# Patient Record
Sex: MALE | Race: WHITE | NOT HISPANIC OR LATINO
[De-identification: names, ages, dates, MRNs, and addresses within clinical notes are randomized per-mention and may not be internally consistent; named-entity substitution may affect disease eponyms.]

---

## 2020-06-16 ENCOUNTER — APPOINTMENT (OUTPATIENT)
Dept: ORTHOPEDIC SURGERY | Facility: CLINIC | Age: 45
End: 2020-06-16
Payer: COMMERCIAL

## 2020-06-16 VITALS — HEIGHT: 71 IN | BODY MASS INDEX: 34.3 KG/M2 | WEIGHT: 245 LBS

## 2020-06-16 DIAGNOSIS — Z72.3 LACK OF PHYSICAL EXERCISE: ICD-10-CM

## 2020-06-16 DIAGNOSIS — Z78.9 OTHER SPECIFIED HEALTH STATUS: ICD-10-CM

## 2020-06-16 DIAGNOSIS — Z80.9 FAMILY HISTORY OF MALIGNANT NEOPLASM, UNSPECIFIED: ICD-10-CM

## 2020-06-16 PROBLEM — Z00.00 ENCOUNTER FOR PREVENTIVE HEALTH EXAMINATION: Status: ACTIVE | Noted: 2020-06-16

## 2020-06-16 PROCEDURE — 99204 OFFICE O/P NEW MOD 45 MIN: CPT

## 2020-06-16 PROCEDURE — 72050 X-RAY EXAM NECK SPINE 4/5VWS: CPT

## 2020-06-18 PROBLEM — Z78.9 NON-SMOKER: Status: ACTIVE | Noted: 2020-06-16

## 2020-06-18 PROBLEM — Z78.9 NO PERTINENT PAST MEDICAL HISTORY: Status: RESOLVED | Noted: 2020-06-18 | Resolved: 2020-06-18

## 2020-06-18 PROBLEM — Z78.9 DOES NOT USE ILLICIT DRUGS: Status: ACTIVE | Noted: 2020-06-16

## 2020-06-18 PROBLEM — Z72.3 DOES NOT EXERCISE: Status: ACTIVE | Noted: 2020-06-16

## 2020-06-18 PROBLEM — Z80.9 FAMILY HISTORY OF MALIGNANT NEOPLASM: Status: ACTIVE | Noted: 2020-06-16

## 2020-06-18 RX ORDER — TIZANIDINE 2 MG/1
2 TABLET ORAL
Qty: 90 | Refills: 0 | Status: ACTIVE | COMMUNITY
Start: 2020-05-21

## 2020-06-18 RX ORDER — KETOROLAC TROMETHAMINE 60 MG/2ML
60 INJECTION, SOLUTION INTRAMUSCULAR
Qty: 2 | Refills: 0 | Status: ACTIVE | COMMUNITY
Start: 2020-01-07

## 2020-06-18 RX ORDER — METHYLPREDNISOLONE 4 MG/1
4 TABLET ORAL
Qty: 21 | Refills: 0 | Status: ACTIVE | COMMUNITY
Start: 2020-03-05

## 2020-06-18 RX ORDER — GABAPENTIN 100 MG/1
CAPSULE ORAL
Refills: 0 | Status: ACTIVE | COMMUNITY

## 2020-06-18 RX ORDER — CEFTRIAXONE 2 G/1
2 INJECTION, POWDER, FOR SOLUTION INTRAMUSCULAR; INTRAVENOUS
Qty: 1 | Refills: 0 | Status: ACTIVE | COMMUNITY
Start: 2020-01-07

## 2020-06-18 RX ORDER — GENTAMICIN SULFATE 40 MG/ML
40 INJECTION, SOLUTION INTRAMUSCULAR; INTRAVENOUS
Qty: 4 | Refills: 0 | Status: ACTIVE | COMMUNITY
Start: 2020-01-07

## 2020-06-18 NOTE — CONSULT LETTER
[Consult Letter:] : I had the pleasure of evaluating your patient, [unfilled]. [Dear  ___] : Dear  [unfilled], [Please see my note below.] : Please see my note below. [Sincerely,] : Sincerely, [FreeTextEntry3] : Harris Torres MD\par Ortho Spine Surgery\par [facility]

## 2020-06-18 NOTE — PHYSICAL EXAM
[de-identified] : General: No acute distress, conversant, well-nourished.\par Head: Normocephalic, atraumatic\par Neck: trachea midline, FROM\par Heart: normotensive and normal rate and rhythm\par Lungs: No labored breathing\par Skin: No abrasions, no rashes, no edema\par Psych: Alert and oriented to person, place and time\par Extremities: no peripheral edema or digital cyanosis\par Gait: Normal gait. Can perform tandem gait.  \par Vascular: warm and well perfused distally, palpable distal pulses\par \par MSK:\par Cervical Spine: \par No tenderness to palpation.  No step-off, no deformity.\par \par NEURO:\par Sensation \par          Left           \par C5     1/2               \par C6     1/2               \par C7     1/2               \par C8    1/2              \par T1     2/2             \par \par          Right         \par C5     1/2               \par C6     1/2               \par C7     1/2               \par C8     1/2              \par T1     2/2      \par \par Motor: \par                                                Left             \par C5 (deltoid abduction)             5/5               \par C6 (biceps flexion)                   5/5                \par C7 (triceps extension)             5/5               \par C8 (finger flexion)                     5/5               \par T1 (interosseous)                     5/5           \par \par                                                Right           \par C5 (deltoid abduction)             5/5               \par C6 (biceps flexion)                   5/5                \par C7 (triceps extension)             5/5               \par C8 (finger flexion)                     5/5               \par T1 (interosseous)                     5/5                     \par \par Sensation \par Left L2  -  2/2            \par Left L3  -  2/2\par Left L4  -  2/2\par Left L5  -  2/2\par Left S1  -  2/2\par \par Right L2  -  2/2            \par Right L3  -  2/2\par Right L4  -  2/2\par Right L5  -  2/2\par Right S1  -  2/2\par \par Motor: \par Left L2 (hip flexion)                            5/5                \par Left L3 (knee extension)                   5/5                \par Left L4 (ankle dorsiflexion)                 5/5                \par Left L5 (long toe extensor)                5/5                \par Left S1 (ankle plantar flexion)           5/5\par \par Right L2 (hip flexion)                            5/5                \par Right L3 (knee extension)                   5/5                \par Right L4 (ankle dorsiflexion)                 5/5                \par Right L5 (long toe extensor)                5/5                \par Right S1 (ankle plantar flexion)           5/5\par \par Reflexes: Increased reflexes in upper extremities\par Negative Spurling’s test.  Positive Almeida’s reflex.  \par Negative clonus.  Down-going Babinski. [de-identified] : Cervical MRI (3/9/20): Large C3-C4 right sided disc herniation with compression of the spinal cord.  T2 signal change in the cord likely representing myelomalacia. Multilevel disc bulges at C4-C5, C5-C6 and C6-C7.\par \par Cervical AP, lateral, flexion and extension radiographs obtained in the office today shows no fracture or dislocation.  There is cervical spondylosis and prominent anterior osteophyte formation at C4-C5, C5-C6, and C6-C7.  Multilevel loss of disc height.   No instability seen on dynamic films.

## 2020-06-18 NOTE — HISTORY OF PRESENT ILLNESS
[de-identified] : 44 year old male presents with 6 months of progressively worsening hand tingling and numbness.  Her also describes muscle aches in his arms.  He describes feeling weak in his hands and has been dropping items frequently.  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  He denies any balance problems, or bowel/bladder symptoms.  He initially went to a pain management doctor who provided him with a cervical epidural that did not provide any relief of his symptoms.

## 2020-06-18 NOTE — ASSESSMENT
[FreeTextEntry1] : 44 year old male presents with 6 months of progressively worsening numbness and weakness in his hands.  He has been dropping items and reports fine motor skill impairment at his work.  He has a C3-C4 disc herniation causing significant cord compression with cord signal change likely representing myelomalacia.  He has signs of myelopathy.  We discussed the risks and benefits of surgery: ACDF C3-C4.  The risks include anesthesia, blood loss, need for blood transfusion, clots, stroke, myocardial infarct, chronic pain, loss of function, damage to neurovascular structures, hardware failure, nonunion and need for reoperation. The patient understands these risks and elects to proceed with surgery.  He asked several excellent questions which were answered.  He understands that due to his cord compression if he has a fall he has a high chance of a spinal cord injury.  He understands this and will avoid activities such as biking.  He will be scheduled for surgery.  He will be sent for preoperative clearance.  He will get a cervical CT for preop planning.  He will followup prior to the date of his surgery.  He knows to call with any questions or concerns or if his symptoms acutely worsen.

## 2020-07-14 ENCOUNTER — APPOINTMENT (OUTPATIENT)
Dept: ORTHOPEDIC SURGERY | Facility: CLINIC | Age: 45
End: 2020-07-14
Payer: COMMERCIAL

## 2020-07-14 VITALS — TEMPERATURE: 97.6 F

## 2020-07-14 PROCEDURE — 99213 OFFICE O/P EST LOW 20 MIN: CPT

## 2020-07-17 VITALS
HEART RATE: 70 BPM | TEMPERATURE: 97 F | HEIGHT: 71 IN | RESPIRATION RATE: 16 BRPM | DIASTOLIC BLOOD PRESSURE: 85 MMHG | SYSTOLIC BLOOD PRESSURE: 127 MMHG | OXYGEN SATURATION: 98 % | WEIGHT: 244.71 LBS

## 2020-07-17 RX ORDER — POVIDONE-IODINE 5 %
1 AEROSOL (ML) TOPICAL ONCE
Refills: 0 | Status: COMPLETED | OUTPATIENT
Start: 2020-07-20 | End: 2020-07-20

## 2020-07-19 ENCOUNTER — TRANSCRIPTION ENCOUNTER (OUTPATIENT)
Age: 45
End: 2020-07-19

## 2020-07-19 NOTE — REVIEW OF SYSTEMS
[Negative] : Heme/Lymph [FreeTextEntry9] : neck pain radiating to bilateral upper extremities, tingling in hands

## 2020-07-19 NOTE — ASSESSMENT
[FreeTextEntry1] : 44 year old male with cervical myelopathy and radiculopathy worsening over the last 7 months.  He has numbness in his hand bilateral and reports fine motor deficits. He has cord compression with signal change at C3-C4.  We recommending an ACDF C3-C4 to decompression the spinal cord. He also has evidence of OPLL and moderate stenosis C4-C5, C5-C6 and C6-C7.  He will likely require additional surgery in the future if this worsens which he understands.  At this time the cord compression with signal change must be addressed.  We discussed the risks and benefits of surgery.  The risks include anesthesia, blood loss, need for blood transfusion, Covid-19, durotomy, infection, clots, stroke, myocardial infarct, chronic pain, loss of function, damage to neurovascular structures, hardware failure, nonunion and need for reoperation. The patient understands these risks and elects to proceed with surgery.  He will obtain a Covid-19 test 72 hours prior to his surgery.  He knows to call with any questions or concerns or if his symptoms acutely worsen.

## 2020-07-19 NOTE — HISTORY OF PRESENT ILLNESS
[de-identified] : 44 year old male returns for followup with continued hand numbness, weakness with  strength and fine motor deficits.  He has pain in his neck and hands.  He is here to discuss surgical intervention. Since his last visit he had a cervical CT scan.  He denies any balance problems or bowel/bladder issues.

## 2020-07-19 NOTE — PHYSICAL EXAM
[de-identified] : General: No acute distress, conversant, well-nourished.\par Head: Normocephalic, atraumatic\par Neck: trachea midline, FROM\par Heart: normotensive and normal rate and rhythm\par Lungs: No labored breathing\par Skin: No abrasions, no rashes, no edema\par Psych: Alert and oriented to person, place and time\par Extremities: no peripheral edema or digital cyanosis\par Gait: Normal gait. Can perform tandem gait.  \par Vascular: warm and well perfused distally, palpable distal pulses\par \par MSK:\par Cervical Spine: \par No tenderness to palpation.  No step-off, no deformity.\par \par NEURO:\par Sensation \par          Left           \par C5     1/2               \par C6     1/2               \par C7     1/2               \par C8    1/2              \par T1     2/2             \par \par          Right         \par C5     1/2               \par C6     1/2               \par C7     1/2               \par C8     1/2              \par T1     2/2      \par \par Motor: \par                                                Left             \par C5 (deltoid abduction)             5/5               \par C6 (biceps flexion)                   5/5                \par C7 (triceps extension)             5/5               \par C8 (finger flexion)                     5/5               \par T1 (interosseous)                     5/5           \par \par                                                Right           \par C5 (deltoid abduction)             5/5               \par C6 (biceps flexion)                   5/5                \par C7 (triceps extension)             5/5               \par C8 (finger flexion)                     5/5               \par T1 (interosseous)                     5/5                     \par \par Sensation \par Left L2  -  2/2            \par Left L3  -  2/2\par Left L4  -  2/2\par Left L5  -  2/2\par Left S1  -  2/2\par \par Right L2  -  2/2            \par Right L3  -  2/2\par Right L4  -  2/2\par Right L5  -  2/2\par Right S1  -  2/2\par \par Motor: \par Left L2 (hip flexion)                            5/5                \par Left L3 (knee extension)                   5/5                \par Left L4 (ankle dorsiflexion)                 5/5                \par Left L5 (long toe extensor)                5/5                \par Left S1 (ankle plantar flexion)           5/5\par \par Right L2 (hip flexion)                            5/5                \par Right L3 (knee extension)                   5/5                \par Right L4 (ankle dorsiflexion)                 5/5                \par Right L5 (long toe extensor)                5/5                \par Right S1 (ankle plantar flexion)           5/5\par \par Reflexes: Increased reflexes in upper extremities\par Negative Spurling’s test.  Positive Almeida’s reflex.  \par Negative clonus.  Down-going Babinski. [de-identified] : CT Cervical Spine (6/23/20): Multilevel degenerative changes of the cervical spine with evidence of DISH and OPLL.  Moderate to severe stenosis with cord compression at C3-C4. Right worse than left disc osteophyte complex at C3-C4.   Moderate stenosis at C4-C5, C5-C6, C6-C7.  \par \par Cervical MRI (3/9/20): Large C3-C4 right sided disc herniation with compression of the spinal cord.  T2 signal change in the cord likely representing myelomalacia. Multilevel disc herniations at C4-C5, C5-C6 and C6-C7.  Disc herniations at C5-C6, C6-C7 are left sided.  \par \par Cervical AP, lateral, flexion and extension radiographs (6/16/20) shows no fracture or dislocation.  There is cervical spondylosis and prominent anterior osteophyte formation at C4-C5, C5-C6, and C6-C7.  Multilevel loss of disc height.   No instability seen on dynamic films.

## 2020-07-20 ENCOUNTER — APPOINTMENT (OUTPATIENT)
Dept: ORTHOPEDIC SURGERY | Facility: HOSPITAL | Age: 45
End: 2020-07-20

## 2020-07-20 ENCOUNTER — INPATIENT (INPATIENT)
Facility: HOSPITAL | Age: 45
LOS: 1 days | Discharge: HOME | DRG: 472 | End: 2020-07-22
Attending: STUDENT IN AN ORGANIZED HEALTH CARE EDUCATION/TRAINING PROGRAM | Admitting: STUDENT IN AN ORGANIZED HEALTH CARE EDUCATION/TRAINING PROGRAM
Payer: COMMERCIAL

## 2020-07-20 DIAGNOSIS — Z41.9 ENCOUNTER FOR PROCEDURE FOR PURPOSES OTHER THAN REMEDYING HEALTH STATE, UNSPECIFIED: Chronic | ICD-10-CM

## 2020-07-20 DIAGNOSIS — G47.33 OBSTRUCTIVE SLEEP APNEA (ADULT) (PEDIATRIC): ICD-10-CM

## 2020-07-20 DIAGNOSIS — E78.5 HYPERLIPIDEMIA, UNSPECIFIED: ICD-10-CM

## 2020-07-20 DIAGNOSIS — R73.03 PREDIABETES: ICD-10-CM

## 2020-07-20 DIAGNOSIS — N20.0 CALCULUS OF KIDNEY: ICD-10-CM

## 2020-07-20 DIAGNOSIS — M54.2 CERVICALGIA: ICD-10-CM

## 2020-07-20 LAB
BLD GP AB SCN SERPL QL: NEGATIVE — SIGNIFICANT CHANGE UP
RH IG SCN BLD-IMP: NEGATIVE — SIGNIFICANT CHANGE UP

## 2020-07-20 PROCEDURE — 22845 INSERT SPINE FIXATION DEVICE: CPT

## 2020-07-20 PROCEDURE — 22551 ARTHRD ANT NTRBDY CERVICAL: CPT

## 2020-07-20 RX ORDER — SODIUM CHLORIDE 9 MG/ML
1000 INJECTION, SOLUTION INTRAVENOUS
Refills: 0 | Status: DISCONTINUED | OUTPATIENT
Start: 2020-07-20 | End: 2020-07-22

## 2020-07-20 RX ORDER — OXYCODONE HYDROCHLORIDE 5 MG/1
5 TABLET ORAL EVERY 4 HOURS
Refills: 0 | Status: DISCONTINUED | OUTPATIENT
Start: 2020-07-20 | End: 2020-07-22

## 2020-07-20 RX ORDER — CHLORHEXIDINE GLUCONATE 213 G/1000ML
1 SOLUTION TOPICAL ONCE
Refills: 0 | Status: COMPLETED | OUTPATIENT
Start: 2020-07-20 | End: 2020-07-20

## 2020-07-20 RX ORDER — ACETAMINOPHEN 500 MG
975 TABLET ORAL EVERY 8 HOURS
Refills: 0 | Status: DISCONTINUED | OUTPATIENT
Start: 2020-07-20 | End: 2020-07-22

## 2020-07-20 RX ORDER — GABAPENTIN 400 MG/1
300 CAPSULE ORAL THREE TIMES A DAY
Refills: 0 | Status: DISCONTINUED | OUTPATIENT
Start: 2020-07-20 | End: 2020-07-22

## 2020-07-20 RX ORDER — ONDANSETRON 8 MG/1
4 TABLET, FILM COATED ORAL EVERY 6 HOURS
Refills: 0 | Status: DISCONTINUED | OUTPATIENT
Start: 2020-07-20 | End: 2020-07-22

## 2020-07-20 RX ORDER — CEFAZOLIN SODIUM 1 G
2000 VIAL (EA) INJECTION EVERY 8 HOURS
Refills: 0 | Status: COMPLETED | OUTPATIENT
Start: 2020-07-20 | End: 2020-07-21

## 2020-07-20 RX ORDER — POLYETHYLENE GLYCOL 3350 17 G/17G
17 POWDER, FOR SOLUTION ORAL DAILY
Refills: 0 | Status: DISCONTINUED | OUTPATIENT
Start: 2020-07-20 | End: 2020-07-22

## 2020-07-20 RX ORDER — MAGNESIUM HYDROXIDE 400 MG/1
30 TABLET, CHEWABLE ORAL DAILY
Refills: 0 | Status: DISCONTINUED | OUTPATIENT
Start: 2020-07-20 | End: 2020-07-22

## 2020-07-20 RX ORDER — BENZOCAINE AND MENTHOL 5; 1 G/100ML; G/100ML
1 LIQUID ORAL EVERY 4 HOURS
Refills: 0 | Status: DISCONTINUED | OUTPATIENT
Start: 2020-07-20 | End: 2020-07-22

## 2020-07-20 RX ORDER — OXYCODONE HYDROCHLORIDE 5 MG/1
10 TABLET ORAL EVERY 4 HOURS
Refills: 0 | Status: DISCONTINUED | OUTPATIENT
Start: 2020-07-20 | End: 2020-07-22

## 2020-07-20 RX ORDER — HYDROMORPHONE HYDROCHLORIDE 2 MG/ML
0.5 INJECTION INTRAMUSCULAR; INTRAVENOUS; SUBCUTANEOUS
Refills: 0 | Status: DISCONTINUED | OUTPATIENT
Start: 2020-07-20 | End: 2020-07-22

## 2020-07-20 RX ORDER — HYDROMORPHONE HYDROCHLORIDE 2 MG/ML
0.5 INJECTION INTRAMUSCULAR; INTRAVENOUS; SUBCUTANEOUS EVERY 4 HOURS
Refills: 0 | Status: DISCONTINUED | OUTPATIENT
Start: 2020-07-20 | End: 2020-07-22

## 2020-07-20 RX ORDER — DIAZEPAM 5 MG
5 TABLET ORAL EVERY 8 HOURS
Refills: 0 | Status: DISCONTINUED | OUTPATIENT
Start: 2020-07-20 | End: 2020-07-22

## 2020-07-20 RX ADMIN — Medication 100 MILLIGRAM(S): at 17:21

## 2020-07-20 RX ADMIN — Medication 975 MILLIGRAM(S): at 17:22

## 2020-07-20 RX ADMIN — GABAPENTIN 300 MILLIGRAM(S): 400 CAPSULE ORAL at 17:34

## 2020-07-20 RX ADMIN — GABAPENTIN 300 MILLIGRAM(S): 400 CAPSULE ORAL at 21:33

## 2020-07-20 RX ADMIN — Medication 975 MILLIGRAM(S): at 18:38

## 2020-07-20 RX ADMIN — Medication 1 APPLICATION(S): at 07:35

## 2020-07-20 RX ADMIN — CHLORHEXIDINE GLUCONATE 1 APPLICATION(S): 213 SOLUTION TOPICAL at 07:46

## 2020-07-20 NOTE — H&P ADULT - NSHPLABSRESULTS_GEN_ALL_CORE
Preop cbc/cmp/pt/inr/ptt/ua- nwl  Preop EKG: NSR, reviewed by medical clearance.   Povidone iodine nasal swab to be given day of surgery   type and screen DOS  COVID PCR negative 7/18/20

## 2020-07-20 NOTE — H&P ADULT - PROBLEM/PLAN-4
Discussed importance of compliance with ocular meds and follow up exams to prevent loss of vision. DISPLAY PLAN FREE TEXT

## 2020-07-20 NOTE — PROGRESS NOTE ADULT - SUBJECTIVE AND OBJECTIVE BOX
Ortho Post Op Check    Procedure: ACDF C3-C4   Surgeon: Brian     Patient seen and examined at bedside in PACU  Pt comfortable without complaints, pain controlled  Denies CP, SOB, N/V  c/o numbness bilateral digits consistent with preop     Vital Signs Last 24 Hrs  T(C): 36.7 (07-20-20 @ 12:43), Max: 36.7 (07-20-20 @ 12:43)  T(F): 98 (07-20-20 @ 12:43), Max: 98 (07-20-20 @ 12:43)  HR: 101 (07-20-20 @ 12:53) (101 - 104)  BP: 145/72 (07-20-20 @ 12:53) (141/73 - 145/79)  BP(mean): 100 (07-20-20 @ 12:53) (100 - 104)  RR: 21 (07-20-20 @ 12:53) (11 - 21)  SpO2: 98% (07-20-20 @ 12:53) (97% - 99%)  AVSS    General: Pt Alert and oriented, NAD  Dressing C/D/I over anterior cervical incision  GLADYS x1 in place   Pulses: 2+ radial pulses bilaterally   Sensation: decreased bilateral upper extremities forearms/digits   Motor:  strength/biceps/triceps AIN/PIN 5/5           A/P: 44yMale POD#0 s/p ACDF C3-C4     - Stable in PACU   - Pain Control IV, PO PRN   - DVT ppx: SCDs  - Post op abx: ancef x2   - monitor GLADYS drain output   - PT, WBS: WBAT, [place] J collar     Ortho Pager [phone]

## 2020-07-20 NOTE — H&P ADULT - NSHPPHYSICALEXAM_GEN_ALL_CORE
Gen: NAD sitting comfortably on chair  MSK: Skin well perfused. Radial pulses palpable BUE. Sensation intact to BUE although diminished to bilateral medial forearms compared to rest of arms.  strength/biceps/triceps 5/5 bilateral upper extremities. Decreased ROM cervical spine 2/2 pain.  Remainder of physical exam as per medical clearance note

## 2020-07-20 NOTE — H&P ADULT - NSICDXPASTMEDICALHX_GEN_ALL_CORE_FT
PAST MEDICAL HISTORY:  Borderline diabetes resolved after gastric sleeve    HLD (hyperlipidemia)     Morbid obesity     Neck pain     Nephrolithiasis     LISA (obstructive sleep apnea) resolved after gastric sleeve

## 2020-07-20 NOTE — H&P ADULT - NSICDXPASTSURGICALHX_GEN_ALL_CORE_FT
PAST SURGICAL HISTORY:  Surgery, elective sleeve gastrectomy, 2017    Surgery, elective right middle finger    Surgery, elective kidney stones

## 2020-07-20 NOTE — H&P ADULT - HISTORY OF PRESENT ILLNESS
44M with neck pain x 6 months without preceding accident, injury or trauma. The patient complains of neck pain with radiation of pain/numbness to arms (bilateral elbows to finger tips). Failed conservative therapies for his symptoms including physical therapy/epidural injections. Takes tylenol/gabapentin for his pain. Denies hx of DVT.  presents today for elective ACDF C3-4

## 2020-07-20 NOTE — H&P ADULT - PROBLEM SELECTOR PLAN 1
Admit to Orthopaedic Service.  Presents today for elective ACDF C3-4  Pt medically stable and cleared for procedure today by Dr. Back

## 2020-07-20 NOTE — PACU DISCHARGE NOTE - COMMENTS
PACU criteria met. See flowsheet. Report given to Yaima Collado RN managerJoe. Patient transported to [address] [place] [address] by RN and CNA.

## 2020-07-21 ENCOUNTER — TRANSCRIPTION ENCOUNTER (OUTPATIENT)
Age: 45
End: 2020-07-21

## 2020-07-21 LAB
ANION GAP SERPL CALC-SCNC: 12 MMOL/L — SIGNIFICANT CHANGE UP (ref 5–17)
BUN SERPL-MCNC: 7 MG/DL — SIGNIFICANT CHANGE UP (ref 7–23)
CALCIUM SERPL-MCNC: 9.3 MG/DL — SIGNIFICANT CHANGE UP (ref 8.4–10.5)
CHLORIDE SERPL-SCNC: 104 MMOL/L — SIGNIFICANT CHANGE UP (ref 96–108)
CO2 SERPL-SCNC: 26 MMOL/L — SIGNIFICANT CHANGE UP (ref 22–31)
CREAT SERPL-MCNC: 0.59 MG/DL — SIGNIFICANT CHANGE UP (ref 0.5–1.3)
GLUCOSE SERPL-MCNC: 94 MG/DL — SIGNIFICANT CHANGE UP (ref 70–99)
HCT VFR BLD CALC: 44.3 % — SIGNIFICANT CHANGE UP (ref 39–50)
HGB BLD-MCNC: 14.9 G/DL — SIGNIFICANT CHANGE UP (ref 13–17)
MCHC RBC-ENTMCNC: 28.6 PG — SIGNIFICANT CHANGE UP (ref 27–34)
MCHC RBC-ENTMCNC: 33.6 GM/DL — SIGNIFICANT CHANGE UP (ref 32–36)
MCV RBC AUTO: 85 FL — SIGNIFICANT CHANGE UP (ref 80–100)
NRBC # BLD: 0 /100 WBCS — SIGNIFICANT CHANGE UP (ref 0–0)
PLATELET # BLD AUTO: 153 K/UL — SIGNIFICANT CHANGE UP (ref 150–400)
POTASSIUM SERPL-MCNC: 3.8 MMOL/L — SIGNIFICANT CHANGE UP (ref 3.5–5.3)
POTASSIUM SERPL-SCNC: 3.8 MMOL/L — SIGNIFICANT CHANGE UP (ref 3.5–5.3)
RBC # BLD: 5.21 M/UL — SIGNIFICANT CHANGE UP (ref 4.2–5.8)
RBC # FLD: 13.2 % — SIGNIFICANT CHANGE UP (ref 10.3–14.5)
SODIUM SERPL-SCNC: 142 MMOL/L — SIGNIFICANT CHANGE UP (ref 135–145)
WBC # BLD: 9.07 K/UL — SIGNIFICANT CHANGE UP (ref 3.8–10.5)
WBC # FLD AUTO: 9.07 K/UL — SIGNIFICANT CHANGE UP (ref 3.8–10.5)

## 2020-07-21 PROCEDURE — 72040 X-RAY EXAM NECK SPINE 2-3 VW: CPT | Mod: 26

## 2020-07-21 RX ORDER — TIZANIDINE 4 MG/1
0 TABLET ORAL
Qty: 0 | Refills: 0 | DISCHARGE

## 2020-07-21 RX ORDER — ACETAMINOPHEN 500 MG
1 TABLET ORAL
Qty: 0 | Refills: 0 | DISCHARGE

## 2020-07-21 RX ORDER — GABAPENTIN 400 MG/1
1 CAPSULE ORAL
Qty: 0 | Refills: 0 | DISCHARGE

## 2020-07-21 RX ORDER — DIAZEPAM 5 MG
1 TABLET ORAL
Qty: 15 | Refills: 0
Start: 2020-07-21 | End: 2020-07-25

## 2020-07-21 RX ORDER — ACETAMINOPHEN 500 MG
0 TABLET ORAL
Qty: 0 | Refills: 0 | DISCHARGE

## 2020-07-21 RX ORDER — DEXAMETHASONE 0.5 MG/5ML
6 ELIXIR ORAL EVERY 6 HOURS
Refills: 0 | Status: DISCONTINUED | OUTPATIENT
Start: 2020-07-21 | End: 2020-07-22

## 2020-07-21 RX ORDER — GABAPENTIN 400 MG/1
1 CAPSULE ORAL
Qty: 42 | Refills: 0
Start: 2020-07-21 | End: 2020-08-03

## 2020-07-21 RX ORDER — DIAZEPAM 5 MG
1 TABLET ORAL
Qty: 0 | Refills: 0 | DISCHARGE
Start: 2020-07-21

## 2020-07-21 RX ORDER — OXYCODONE HYDROCHLORIDE 5 MG/1
1 TABLET ORAL
Qty: 42 | Refills: 0
Start: 2020-07-21 | End: 2020-07-27

## 2020-07-21 RX ADMIN — GABAPENTIN 300 MILLIGRAM(S): 400 CAPSULE ORAL at 06:11

## 2020-07-21 RX ADMIN — Medication 6 MILLIGRAM(S): at 14:06

## 2020-07-21 RX ADMIN — GABAPENTIN 300 MILLIGRAM(S): 400 CAPSULE ORAL at 21:42

## 2020-07-21 RX ADMIN — Medication 100 MILLIGRAM(S): at 00:54

## 2020-07-21 RX ADMIN — Medication 6 MILLIGRAM(S): at 20:19

## 2020-07-21 RX ADMIN — GABAPENTIN 300 MILLIGRAM(S): 400 CAPSULE ORAL at 14:06

## 2020-07-21 RX ADMIN — SODIUM CHLORIDE 120 MILLILITER(S): 9 INJECTION, SOLUTION INTRAVENOUS at 00:55

## 2020-07-21 RX ADMIN — Medication 6 MILLIGRAM(S): at 09:24

## 2020-07-21 NOTE — PROGRESS NOTE ADULT - SUBJECTIVE AND OBJECTIVE BOX
Ortho Note    Pt seen and examined. C/o mild pain and difficulty with   swallowing solid foods, but can tolerate soft and liquids.  Denies CP, SOB, N/V.     Vital Signs Last 24 Hrs  T(C): 36.7 (07-21-20 @ 08:44), Max: 36.7 (07-21-20 @ 08:44)  T(F): 98 (07-21-20 @ 08:44), Max: 98 (07-21-20 @ 08:44)  HR: 102 (07-21-20 @ 11:30) (98 - 102)  BP: 159/89 (07-21-20 @ 11:30) (157/86 - 159/89)  BP(mean): --  RR: 18 (07-21-20 @ 08:44) (18 - 18)  SpO2: 97% (07-21-20 @ 11:30) (97% - 97%)  AVSS    General: Pt Alert and oriented, NAD  DSG C/D/I  Pulses: +2 radial pulses b/l  Sensation: SILT BUE, reports some "tingling" which was also present pre-op  Motor: 5/5 shoulder shrug, elbow flexion/ extension, wrist flexion/extension,  BUE                          14.9   9.07  )-----------( 153      ( 21 Jul 2020 07:24 )             44.3   21 Jul 2020 07:24    142    |  104    |  7      ----------------------------<  94     3.8     |  26     |  0.59     Ca    9.3        21 Jul 2020 07:24        A/P: 44yMale POD#1 s/p C3-C$ ACDF  - VSS, Labs WNL  - continue/ monitor GLADYS drain  - Pain Control  - DVT ppx: SCDs  - PT, WBS: WBAT/ [place] J collar  - mild dysphagia- IV steroids x 24h added  - OOB for meals, I/S  - continue bowel regimen  - dispo: home pending GLADYS output/ improvement of dysphagia    Ortho Pager [phone]

## 2020-07-21 NOTE — DISCHARGE NOTE PROVIDER - CARE PROVIDER_API CALL
Harris Torres)  [facility]  [address] [place] [address], [address] [place] [address]  Phone: [phone]  Fax: [phone]  Follow Up Time:

## 2020-07-21 NOTE — PHYSICAL THERAPY INITIAL EVALUATION ADULT - GENERAL OBSERVATIONS, REHAB EVAL
Pt received semi-supine no acute distress +tele +trey drain +Sibley J collar, cleared for PT by KAVITA Liao, agreeable to PT Eval. Left as found +call bell, RN aware. FIM 7

## 2020-07-21 NOTE — DISCHARGE NOTE PROVIDER - NSDCFUADDINST_GEN_ALL_CORE_FT
No strenuous activity (bending/twisting), heavy lifting, driving or returning to work until cleared by MD.  You may shower. Remove dressing daily before shower, pat the area dry gently then reapply dry gauze dressing x 5 days, then leave incision open to air.   Try to have regular bowel movements, take stool softener or laxative if necessary.  May take pepcid or zantac for upset stomach.  Ice affected areas to decrease swelling.  Call to schedule an appt with Dr. Torres for follow up, if you have staples or sutures they will be removed in office.  Contact your doctor if you experience: fever greater than 101.5, chills, chest pain, difficulty breathing, redness or excessive drainage around the incision, other concerns.

## 2020-07-21 NOTE — PROGRESS NOTE ADULT - SUBJECTIVE AND OBJECTIVE BOX
Ortho Note    Pt seen and examined at bedside this AM, reports dysphagia and sore throat, only able to tolerate clears, reports pain controlled and improvement in sensation although decreased fine motor strength. Denies CP, SOB, N/V     Vital Signs Last 24 Hrs  T(C): 36.7 (07-21-20 @ 04:58), Max: 36.7 (07-21-20 @ 04:58)  T(F): 98.1 (07-21-20 @ 04:58), Max: 98.1 (07-21-20 @ 04:58)  HR: 79 (07-21-20 @ 04:58) (79 - 79)  BP: 145/83 (07-21-20 @ 04:58) (145/83 - 145/83)  BP(mean): --  RR: 20 (07-21-20 @ 04:58) (20 - 20)  SpO2: 96% (07-21-20 @ 04:58) (96% - 96%)    General: Pt Alert and oriented, NAD  Neck DSG C/D/I  GLADYS drain in place holding good suction   Sensation intact C5-T1 BL UE  Motor strength 5/5 BL UE  2+ rad pulse  Fingers WWP with good cap refill                          14.9   9.07  )-----------( 153      ( 21 Jul 2020 07:24 )             44.3   21 Jul 2020 07:24    142    |  104    |  7      ----------------------------<  94     3.8     |  26     |  0.59     Ca    9.3        21 Jul 2020 07:24    Drain output:    07-20-20 @ 07:01  -  07-21-20 @ 07:00  --------------------------------------------------------  OUT:    Bulb: 60 mL  Total OUT: 60 mL    A/P: 44yMale POD#1 s/p ACDF C3-C4 7/20  - Stable  - Pain Control  - DVT ppx: SCDs  - PT, WBS: WBAT  - Continue drain, output noted   - Continue hard collar, [place] J collar today   - IV steroids  - Xrays today   - dispo home poss tomorrow     Ortho Pager [phone]

## 2020-07-21 NOTE — PHYSICAL THERAPY INITIAL EVALUATION ADULT - DISCHARGE DISPOSITION, PT EVAL
no skilled PT needs/pt demonstrates independence in all basic mobility and is cleared by PT, safe for d/c home at this time. FIM 7/home

## 2020-07-21 NOTE — PHYSICAL THERAPY INITIAL EVALUATION ADULT - PERTINENT HX OF CURRENT PROBLEM, REHAB EVAL
44M with neck pain x 6 months without preceding accident, injury or trauma. The patient complains of neck pain with radiation of pain/numbness to arms (bilateral elbows to finger tips). Failed conservative therapies for his symptoms including physical therapy/epidural injections.

## 2020-07-21 NOTE — DISCHARGE NOTE PROVIDER - NSDCMRMEDTOKEN_GEN_ALL_CORE_FT
gabapentin 300 mg oral capsule: 1 cap(s) orally 3 times a day  Amy J : S/p: ACDF C3/C4  tiZANidine 2 mg oral tablet:   Tylenol 500 mg oral tablet: diazePAM 5 mg oral tablet: 1 tab(s) orally every 8 hours, As needed, Muscle spasm  gabapentin 300 mg oral capsule: 1 cap(s) orally 3 times a day  Amy J : S/p: ACDF C3/C4  tiZANidine 2 mg oral tablet:   Tylenol 500 mg oral tablet: diazePAM 5 mg oral tablet: 1 tab(s) orally every 8 hours, As needed, Muscle spasm MDD:3  gabapentin 300 mg oral capsule: 1 cap(s) orally 3 times a day  oxyCODONE 5 mg oral tablet: 1-2 tab(s) orally every 4-6 hours, as needed for moderate to severe pain MDD:6  Tylenol 500 mg oral tablet: 1 tab(s) orally every 4 hours, As Needed, as needed for mild pain

## 2020-07-21 NOTE — PROGRESS NOTE ADULT - SUBJECTIVE AND OBJECTIVE BOX
S: NAEO.  Patient reports pain well-controlled.  No radicular pain.  Patient says his sensation is improving.  He c/o sore throat and dysphagia.  Can tolerate PO medications but difficulty with large volumes of clears.  GLADYS drain in place with serosanguinous drainage (output 40/60)  dressings c/d/i  cervical orthosis in place    NEURO:  Sensation            Left*             C5     1/2                 C6     1/2                 C7     1/2                 C8     1/2                T1     2/2               *patient reports improved sensation compared to preop baseline             Right*           C5     1/2                 C6     1/2                 C7     1/2                 C8     1/2                T1     2/2        *patient reports improved sensation compared to preop baseline    Motor:                                                  Left               C5 (deltoid abduction)             5/5                 C6 (biceps flexion)                   5/5                  C7 (triceps extension)             5/5                 C8 (finger flexion)                    5/5                 T1 (interosseous)                     5/5                                                              Right             C5 (deltoid abduction)             5/5                 C6 (biceps flexion)                   5/5                  C7 (triceps extension)             5/5                 C8 (finger flexion)                     5/5                T1 (interosseous)                     5/5                        Sensation   Left L2  -  2/2              Left L3  -  2/2  Left L4  -  2/2  Left L5  -  2/2  Left S1  -  2/2    Right L2  -  2/2              Right L3  -  2/2  Right L4  -  2/2  Right L5  -  2/2  Right S1  -  2/2    Motor:   Left L2 (hip flexion)                            5/5                  Left L3 (knee extension)                   5/5                  Left L4 (ankle dorsiflexion)                 5/5                  Left L5 (long toe extensor)                5/5                  Left S1 (ankle plantar flexion)           5/5    Right L2 (hip flexion)                            5/5                  Right L3 (knee extension)                   5/5                  Right L4 (ankle dorsiflexion)                 5/5                  Right L5 (long toe extensor)                5/5                  Right S1 (ankle plantar flexion)           5/5

## 2020-07-21 NOTE — PROGRESS NOTE ADULT - ASSESSMENT
A: 45 y/o male with cervical myelopathy s/p ACDF C3-C4 POD#1  P. 1. Pain control  2. Drain output 40/60.  Continue drain to self-suction  3. Cervical orthosis at all times with exception of cleaning and eating.    4. IV Steroids for dysphagia  5. Routine upright cervical radiographs  6. PT/OT  7. Mobilize with assistance  8. Dispo planning

## 2020-07-21 NOTE — DISCHARGE NOTE PROVIDER - NSDCCPTREATMENT_GEN_ALL_CORE_FT
PRINCIPAL PROCEDURE  Procedure: Anterior cervical discectomy with fusion  Findings and Treatment: C3-C4

## 2020-07-21 NOTE — DISCHARGE NOTE PROVIDER - HOSPITAL COURSE
Admit to Orthopaedics for ACDF C3-C4    Perioperative Antibiotics    DVT prophylaxis    Physical Therapy    Pain Management

## 2020-07-22 ENCOUNTER — TRANSCRIPTION ENCOUNTER (OUTPATIENT)
Age: 45
End: 2020-07-22

## 2020-07-22 VITALS
DIASTOLIC BLOOD PRESSURE: 74 MMHG | RESPIRATION RATE: 16 BRPM | TEMPERATURE: 99 F | SYSTOLIC BLOOD PRESSURE: 150 MMHG | HEART RATE: 100 BPM | OXYGEN SATURATION: 95 %

## 2020-07-22 LAB
ANION GAP SERPL CALC-SCNC: 14 MMOL/L — SIGNIFICANT CHANGE UP (ref 5–17)
BUN SERPL-MCNC: 14 MG/DL — SIGNIFICANT CHANGE UP (ref 7–23)
CALCIUM SERPL-MCNC: 10 MG/DL — SIGNIFICANT CHANGE UP (ref 8.4–10.5)
CHLORIDE SERPL-SCNC: 102 MMOL/L — SIGNIFICANT CHANGE UP (ref 96–108)
CO2 SERPL-SCNC: 23 MMOL/L — SIGNIFICANT CHANGE UP (ref 22–31)
CREAT SERPL-MCNC: 0.58 MG/DL — SIGNIFICANT CHANGE UP (ref 0.5–1.3)
GLUCOSE SERPL-MCNC: 164 MG/DL — HIGH (ref 70–99)
HCT VFR BLD CALC: 50.1 % — HIGH (ref 39–50)
HGB BLD-MCNC: 16.6 G/DL — SIGNIFICANT CHANGE UP (ref 13–17)
MCHC RBC-ENTMCNC: 28 PG — SIGNIFICANT CHANGE UP (ref 27–34)
MCHC RBC-ENTMCNC: 33.1 GM/DL — SIGNIFICANT CHANGE UP (ref 32–36)
MCV RBC AUTO: 84.5 FL — SIGNIFICANT CHANGE UP (ref 80–100)
NRBC # BLD: 0 /100 WBCS — SIGNIFICANT CHANGE UP (ref 0–0)
PLATELET # BLD AUTO: 194 K/UL — SIGNIFICANT CHANGE UP (ref 150–400)
POTASSIUM SERPL-MCNC: 4.1 MMOL/L — SIGNIFICANT CHANGE UP (ref 3.5–5.3)
POTASSIUM SERPL-SCNC: 4.1 MMOL/L — SIGNIFICANT CHANGE UP (ref 3.5–5.3)
RBC # BLD: 5.93 M/UL — HIGH (ref 4.2–5.8)
RBC # FLD: 13.2 % — SIGNIFICANT CHANGE UP (ref 10.3–14.5)
SODIUM SERPL-SCNC: 139 MMOL/L — SIGNIFICANT CHANGE UP (ref 135–145)
WBC # BLD: 12.35 K/UL — HIGH (ref 3.8–10.5)
WBC # FLD AUTO: 12.35 K/UL — HIGH (ref 3.8–10.5)

## 2020-07-22 RX ADMIN — Medication 6 MILLIGRAM(S): at 02:00

## 2020-07-22 RX ADMIN — GABAPENTIN 300 MILLIGRAM(S): 400 CAPSULE ORAL at 05:46

## 2020-07-22 NOTE — PROGRESS NOTE ADULT - ASSESSMENT
A: 44 year old male with cervical myelopathy s/p ACDF C3-C4 POD#2  P: 1. Pain control  2. Cervical orthosis at all times with exception of eating and cleaning  3. Mobilize as tolerated with assistance  4. Dysphagia improving - tolerating soft foods  5. Anticipate discharge home this am  6. f/u in 2 weeks

## 2020-07-22 NOTE — DISCHARGE NOTE NURSING/CASE MANAGEMENT/SOCIAL WORK - PATIENT PORTAL LINK FT
You can access the FollowMyHealth Patient Portal offered by [facility] by registering at the following website: [email or web address]. By joining [employer]’s FollowMyHealth portal, you will also be able to view your health information using other applications (apps) compatible with our system.

## 2020-07-22 NOTE — PROGRESS NOTE ADULT - SUBJECTIVE AND OBJECTIVE BOX
S:  NAEO.  Pain well-controlled with Tylenol.  Patient reports improvement in dysphagia. He ate baked potato and egg salad yesterday evening.  Patient reports improvement in sensation and hand strength.  Drain was removed yesterday afternoon.    O: NAD AAOx4  Cervical:  dressings c/d/i  cervical orthosis in place    NEURO:  Sensation            Left*             C5     1/2                 C6     1/2                 C7     1/2                 C8     1/2                T1     2/2               *patient reports improved sensation compared to preop baseline             Right*           C5     1/2                 C6     1/2                 C7     1/2                 C8     1/2                T1     2/2        *patient reports improved sensation compared to preop baseline    Motor:                                                  Left               C5 (deltoid abduction)             5/5                 C6 (biceps flexion)                   5/5                  C7 (triceps extension)             5/5                 C8 (finger flexion)                    5/5                 T1 (interosseous)                     5/5                                                              Right             C5 (deltoid abduction)             5/5                 C6 (biceps flexion)                   5/5                  C7 (triceps extension)             5/5                 C8 (finger flexion)                     5/5                T1 (interosseous)                     5/5                        Sensation   Left L2  -  2/2              Left L3  -  2/2  Left L4  -  2/2  Left L5  -  2/2  Left S1  -  2/2    Right L2  -  2/2              Right L3  -  2/2  Right L4  -  2/2  Right L5  -  2/2  Right S1  -  2/2    Motor:   Left L2 (hip flexion)                            5/5                  Left L3 (knee extension)                   5/5                  Left L4 (ankle dorsiflexion)                 5/5                  Left L5 (long toe extensor)                5/5                  Left S1 (ankle plantar flexion)           5/5    Right L2 (hip flexion)                            5/5                  Right L3 (knee extension)                   5/5                  Right L4 (ankle dorsiflexion)                 5/5                  Right L5 (long toe extensor)                5/5                  Right S1 (ankle plantar flexion)           5/5    Imaging: Cervical AP and lateral (7/21/20): Hardware in appropriate position and alignment.  Post-op changes with pre-vertebral swelling.

## 2020-07-23 PROBLEM — M54.2 CERVICALGIA: Chronic | Status: ACTIVE | Noted: 2020-07-17

## 2020-07-23 PROBLEM — G47.33 OBSTRUCTIVE SLEEP APNEA (ADULT) (PEDIATRIC): Chronic | Status: ACTIVE | Noted: 2020-07-17

## 2020-07-23 PROBLEM — E78.5 HYPERLIPIDEMIA, UNSPECIFIED: Chronic | Status: ACTIVE | Noted: 2020-07-17

## 2020-07-23 PROBLEM — E66.01 MORBID (SEVERE) OBESITY DUE TO EXCESS CALORIES: Chronic | Status: ACTIVE | Noted: 2020-07-17

## 2020-07-23 PROBLEM — R73.03 PREDIABETES: Chronic | Status: ACTIVE | Noted: 2020-07-17

## 2020-07-23 PROBLEM — N20.0 CALCULUS OF KIDNEY: Chronic | Status: ACTIVE | Noted: 2020-07-17

## 2020-07-24 PROCEDURE — C1889: CPT

## 2020-07-24 PROCEDURE — 76000 FLUOROSCOPY <1 HR PHYS/QHP: CPT

## 2020-07-24 PROCEDURE — 80048 BASIC METABOLIC PNL TOTAL CA: CPT

## 2020-07-24 PROCEDURE — 85027 COMPLETE CBC AUTOMATED: CPT

## 2020-07-24 PROCEDURE — C1713: CPT

## 2020-07-24 PROCEDURE — 86901 BLOOD TYPING SEROLOGIC RH(D): CPT

## 2020-07-24 PROCEDURE — 72040 X-RAY EXAM NECK SPINE 2-3 VW: CPT

## 2020-07-24 PROCEDURE — 86850 RBC ANTIBODY SCREEN: CPT

## 2020-07-24 PROCEDURE — 97161 PT EVAL LOW COMPLEX 20 MIN: CPT

## 2020-07-30 DIAGNOSIS — E78.5 HYPERLIPIDEMIA, UNSPECIFIED: ICD-10-CM

## 2020-07-30 DIAGNOSIS — R13.10 DYSPHAGIA, UNSPECIFIED: ICD-10-CM

## 2020-07-30 DIAGNOSIS — M48.02 SPINAL STENOSIS, CERVICAL REGION: ICD-10-CM

## 2020-07-30 DIAGNOSIS — Z90.3 ACQUIRED ABSENCE OF STOMACH [PART OF]: ICD-10-CM

## 2020-07-30 DIAGNOSIS — M50.01 CERVICAL DISC DISORDER WITH MYELOPATHY, HIGH CERVICAL REGION: ICD-10-CM

## 2020-08-04 ENCOUNTER — APPOINTMENT (OUTPATIENT)
Dept: ORTHOPEDIC SURGERY | Facility: CLINIC | Age: 45
End: 2020-08-04
Payer: COMMERCIAL

## 2020-08-04 PROCEDURE — 99024 POSTOP FOLLOW-UP VISIT: CPT

## 2020-08-04 NOTE — HISTORY OF PRESENT ILLNESS
[de-identified] : 2 week postop [de-identified] : Photo of cervical Incision c/d/i\par Patient reports improved sensation bilateral as well as improved hand and  strength.   [de-identified] : 44 year old male 2weeks s/p ACDF C3-C4  [de-identified] : 44 year old male s/p ACDF C3-C4 now post-op 2 weeks.  Patient reports improved  strength and improving sensation.  He has mild neck pain for which he takes Tylenol.  His swallowing continues to improve and he is eating soft foods like eggs and oatmeal without issue.  He has been wearing his cervical orthosis.   [de-identified] : Pt is 2 weeks s/p ACDF C3-C4 with continued improvement in strength and sensation. His swallowing continues to improve.  Patient will come into clinic in 3 weeks for radiographs and full physical exam.  He will continue to advance his diet as tolerated.

## 2020-08-25 ENCOUNTER — APPOINTMENT (OUTPATIENT)
Dept: ORTHOPEDIC SURGERY | Facility: CLINIC | Age: 45
End: 2020-08-25
Payer: COMMERCIAL

## 2020-08-25 VITALS — TEMPERATURE: 95.6 F

## 2020-08-25 PROCEDURE — 72040 X-RAY EXAM NECK SPINE 2-3 VW: CPT

## 2020-08-25 PROCEDURE — 99024 POSTOP FOLLOW-UP VISIT: CPT

## 2020-08-29 NOTE — HISTORY OF PRESENT ILLNESS
[3] : the patient reports pain that is 3/10 in severity [___ Weeks Post Op] : [unfilled] weeks post op [Chills] : no chills [Constipation] : no constipation [Diarrhea] : no diarrhea [Dysuria] : no dysuria [Fever] : no fever [Nausea] : no nausea [Vomiting] : no vomiting [de-identified] : 45 year old male 6 weeks s/p ACDF C3-C4.  He notes improvement in his hands. He has better  strength and reports improvement in fine motor skills. His sensation is improving.  He has mild neck pain only when he turns his head.  He has been using his cervical collar.  He is tolerating a regular diet.   [de-identified] : incision well healed\par NEURO:\par Sensation \par  Left \par C5 2/2* \par C6 2/2* \par C7 1/2* \par C8 1/2* \par T1 2/2 \par *improvement from preop\par \par  Right \par C5 2/2* \par C6 2/2* \par C7 1/2* \par C8 1/2* \par T1 2/2 \par *improvement from preop\par Motor: \par    Left \par C5 (deltoid abduction) 5/5 \par C6 (biceps flexion)  5/5 \par C7 (triceps extension) 5/5 \par C8 (finger flexion)  5/5 \par T1 (interosseous)  5/5 \par \par    Right \par C5 (deltoid abduction) 5/5 \par C6 (biceps flexion)  5/5 \par C7 (triceps extension) 5/5 \par C8 (finger flexion)  5/5 \par T1 (interosseous)  5/5  \par \par Sensation \par Left L2 - 2/2 \par Left L3 - 2/2\par Left L4 - 2/2\par Left L5 - 2/2\par Left S1 - 2/2\par \par Right L2 - 2/2 \par Right L3 - 2/2\par Right L4 - 2/2\par Right L5 - 2/2\par Right S1 - 2/2\par \par Motor: \par Left L2 (hip flexion)  5/5 \par Left L3 (knee extension)  5/5 \par Left L4 (ankle dorsiflexion)  5/5 \par Left L5 (long toe extensor) 5/5 \par Left S1 (ankle plantar flexion) 5/5\par \par Right L2 (hip flexion)  5/5 \par Right L3 (knee extension)  5/5 \par Right L4 (ankle dorsiflexion)  5/5 \par Right L5 (long toe extensor) 5/5 \par Right S1 (ankle plantar flexion) 5/5 [de-identified] : Cervical AP and lateral radiographs obtained in the office today show ACDF C3-C4 post-op changes with hardware in appropriate position and alignment [de-identified] : 45 year old male 6 weeks s/p ACDF C3-C4. He continues to have improvement in his hand function and improvement in his strength and sensation. His dysphagia has resolved.  He can discontinue the cervical collar.  He will followup in 2 months.  He knows to call with any questions or concerns or if his symptoms acutely worsen.

## 2020-10-13 ENCOUNTER — APPOINTMENT (OUTPATIENT)
Dept: ORTHOPEDIC SURGERY | Facility: CLINIC | Age: 45
End: 2020-10-13
Payer: COMMERCIAL

## 2020-10-13 PROCEDURE — 99024 POSTOP FOLLOW-UP VISIT: CPT

## 2020-10-13 PROCEDURE — 72040 X-RAY EXAM NECK SPINE 2-3 VW: CPT

## 2020-10-13 NOTE — HISTORY OF PRESENT ILLNESS
[___ Months Post Op] : [unfilled] months post op [de-identified] : 46 y/o male 3 months s/p ACDF C3-C4.  He reports continued improvement in hand function. He says he feels they are back to normal in terms of fine motor skills and strength. He says the sensation has improved with only the tips of his fingers having decreased sensation.  He has been tolerating a regular diet without issue.  He is not taking medications for pain.   [de-identified] : MSK:\par Cervical Spine: \par Incision well-healed\par \par NEURO:\par Sensation \par          Left           \par C5     2/2               \par C6     2/2               \par C7     2/2*               \par C8     2/2*              \par T1     2/2             \par *improved since preop\par \par          Right         \par C5     2/2               \par C6     2/2               \par C7     2/2*               \par C8     2/2*              \par T1     2/2      \par *improved since preop\par \par Motor: \par                                                Left             \par C5 (deltoid abduction)             5/5               \par C6 (biceps flexion)                   5/5                \par C7 (triceps extension)             5/5               \par C8 (finger flexion)                     5/5               \par T1 (interosseous)                     5/5           \par \par                                                Right           \par C5 (deltoid abduction)             5/5               \par C6 (biceps flexion)                   5/5                \par C7 (triceps extension)             5/5               \par C8 (finger flexion)                     5/5               \par T1 (interosseous)                     5/5                     \par \par Sensation \par Left L2  -  2/2            \par Left L3  -  2/2\par Left L4  -  2/2\par Left L5  -  2/2\par Left S1  -  2/2\par \par Right L2  -  2/2            \par Right L3  -  2/2\par Right L4  -  2/2\par Right L5  -  2/2\par Right S1  -  2/2\par \par Motor: \par Left L2 (hip flexion)                            5/5                \par Left L3 (knee extension)                   5/5                \par Left L4 (ankle dorsiflexion)                 5/5                \par Left L5 (long toe extensor)                5/5                \par Left S1 (ankle plantar flexion)           5/5\par \par Right L2 (hip flexion)                            5/5                \par Right L3 (knee extension)                   5/5                \par Right L4 (ankle dorsiflexion)                 5/5                \par Right L5 (long toe extensor)                5/5                \par Right S1 (ankle plantar flexion)           5/5 [de-identified] : AP and lateral cervical radiographs obtained in the office today shows hardware in appropriate position and alignment [de-identified] : 45 year old male 3 months s/p ACDF C3-C4 [de-identified] : His strength and hand function have returned to normal.  He has no issues with fine motor skills.  His sensation continues to improve.  He is very happy with his progress.  He will followup in 3 months.  He knows to call with any questions or concerns or if his symptoms acutely worsen.

## 2020-10-31 ENCOUNTER — TRANSCRIPTION ENCOUNTER (OUTPATIENT)
Age: 45
End: 2020-10-31

## 2021-01-12 ENCOUNTER — APPOINTMENT (OUTPATIENT)
Dept: ORTHOPEDIC SURGERY | Facility: CLINIC | Age: 46
End: 2021-01-12
Payer: COMMERCIAL

## 2021-01-12 DIAGNOSIS — G95.20 UNSPECIFIED CORD COMPRESSION: ICD-10-CM

## 2021-01-12 PROCEDURE — 99214 OFFICE O/P EST MOD 30 MIN: CPT

## 2021-01-12 PROCEDURE — 99072 ADDL SUPL MATRL&STAF TM PHE: CPT

## 2021-01-12 PROCEDURE — 72050 X-RAY EXAM NECK SPINE 4/5VWS: CPT

## 2021-01-12 NOTE — HISTORY OF PRESENT ILLNESS
[de-identified] : 45 year old male 6 months s/p ACDF C3-C4.  He has no pain.  He says his sensation and fine motor skills have returned to normal.  He has no issues at work where his job requires good manual dexterity.  He is very happy with his progress.  He is not taking any medications for pain. He denies any radicular pain, recent illness, fevers, numbness, weakness, balance problems, fine motor deficit, saddle anesthesia, urinary retention or fecal incontinence.

## 2021-01-12 NOTE — PHYSICAL EXAM
[de-identified] : General: No acute distress, conversant, well-nourished.\par Head: Normocephalic, atraumatic\par Neck: trachea midline, FROM\par Heart: normotensive and normal rate and rhythm\par Lungs: No labored breathing\par Skin: No abrasions, no rashes, no edema\par Psych: Alert and oriented to person, place and time\par Extremities: no peripheral edema or digital cyanosis\par Gait: Normal gait. Can perform tandem gait.  \par Vascular: warm and well perfused distally, palpable distal pulses\par \par MSK:\par Cervical Spine: \par No tenderness to palpation.  No step-off, no deformity.\par Incision well-healed\par \par NEURO:\par Sensation \par          Left           \par C5     2/2               \par C6     2/2               \par C7     2/2               \par C8    2/2              \par T1     2/2             \par \par          Right         \par C5     2/2               \par C6     2/2               \par C7     2/2               \par C8     2/2              \par T1     2/2      \par \par Motor: \par                                                Left             \par C5 (deltoid abduction)             5/5               \par C6 (biceps flexion)                   5/5                \par C7 (triceps extension)             5/5               \par C8 (finger flexion)                     5/5               \par T1 (interosseous)                     5/5           \par \par                                                Right           \par C5 (deltoid abduction)             5/5               \par C6 (biceps flexion)                   5/5                \par C7 (triceps extension)             5/5               \par C8 (finger flexion)                     5/5               \par T1 (interosseous)                     5/5                     \par \par Sensation \par Left L2  -  2/2            \par Left L3  -  2/2\par Left L4  -  2/2\par Left L5  -  2/2\par Left S1  -  2/2\par \par Right L2  -  2/2            \par Right L3  -  2/2\par Right L4  -  2/2\par Right L5  -  2/2\par Right S1  -  2/2\par \par Motor: \par Left L2 (hip flexion)                            5/5                \par Left L3 (knee extension)                   5/5                \par Left L4 (ankle dorsiflexion)                 5/5                \par Left L5 (long toe extensor)                5/5                \par Left S1 (ankle plantar flexion)           5/5\par \par Right L2 (hip flexion)                            5/5                \par Right L3 (knee extension)                   5/5                \par Right L4 (ankle dorsiflexion)                 5/5                \par Right L5 (long toe extensor)                5/5                \par Right S1 (ankle plantar flexion)           5/5\par \par Reflexes: normal and symmetric\par Negative Negative Almeida’s reflex.  \par Negative clonus.  Down-going Babinski. [de-identified] : Cervical AP, lateral, flexion and extension radiographs obtained in the office today shows no fracture or dislocation.  s/p ACDF C3-C4 with hardware in appropriate position and alignment.  No instability on dynamic series.  \par \par CT Cervical Spine (6/23/20): Multilevel degenerative changes of the cervical spine with evidence of DISH and OPLL.  Moderate to severe stenosis with cord compression at C3-C4. Right worse than left disc osteophyte complex at C3-C4.   Moderate stenosis at C4-C5, C5-C6, C6-C7.  \par \par Cervical MRI (3/9/20): Large C3-C4 right sided disc herniation with compression of the spinal cord.  T2 signal change in the cord likely representing myelomalacia. Multilevel disc herniations at C4-C5, C5-C6 and C6-C7.  Disc herniations at C5-C6, C6-C7 are left sided.  \par \par Cervical AP, lateral, flexion and extension radiographs (6/16/20) shows no fracture or dislocation.  There is cervical spondylosis and prominent anterior osteophyte formation at C4-C5, C5-C6, and C6-C7.  Multilevel loss of disc height.   No instability seen on dynamic films.

## 2021-01-12 NOTE — ASSESSMENT
[FreeTextEntry1] : 45 year old male 6 months s/p ACDF C3-C4.  He has complete resolution of his radicular pain. His sensation has returned to normal.  He denies any fine motor deficits and has no issues at work which requires fine motor dexterity.  He has no pain.  He is happy with his progress.  He will followup in 6 months. He knows to call with any questions or concerns or if his symptoms acutely worsen.
